# Patient Record
Sex: FEMALE | Race: WHITE | Employment: FULL TIME | URBAN - NONMETROPOLITAN AREA
[De-identification: names, ages, dates, MRNs, and addresses within clinical notes are randomized per-mention and may not be internally consistent; named-entity substitution may affect disease eponyms.]

---

## 2021-01-05 ENCOUNTER — TELEPHONE (OUTPATIENT)
Dept: OBGYN CLINIC | Age: 31
End: 2021-01-05

## 2021-01-05 NOTE — TELEPHONE ENCOUNTER
Patient left a message on the voicemail requesting a return call. I attempted to call her back and her voicemail was full.

## 2021-06-21 ENCOUNTER — TELEPHONE (OUTPATIENT)
Dept: OBGYN CLINIC | Age: 31
End: 2021-06-21

## 2021-06-21 RX ORDER — DROSPIRENONE AND ETHINYL ESTRADIOL TABLETS 0.02-3(28)
KIT ORAL
COMMUNITY
Start: 2021-05-28 | End: 2021-06-21 | Stop reason: SDUPTHER

## 2021-06-21 RX ORDER — DROSPIRENONE AND ETHINYL ESTRADIOL TABLETS 0.02-3(28)
1 KIT ORAL DAILY
Qty: 28 TABLET | Refills: 1 | Status: SHIPPED | OUTPATIENT
Start: 2021-06-21 | End: 2021-08-05

## 2021-08-05 RX ORDER — DROSPIRENONE AND ETHINYL ESTRADIOL 0.02-3(28)
1 KIT ORAL DAILY
Qty: 28 TABLET | Refills: 3 | Status: SHIPPED | OUTPATIENT
Start: 2021-08-05 | End: 2021-11-09 | Stop reason: SDUPTHER

## 2021-08-05 NOTE — TELEPHONE ENCOUNTER
Patient called. She had to reschedule her annual exam for November and requests refills of Kellen to last until then. I pended the medication for you to sign off on.

## 2021-11-09 ENCOUNTER — OFFICE VISIT (OUTPATIENT)
Dept: OBGYN CLINIC | Age: 31
End: 2021-11-09
Payer: MEDICARE

## 2021-11-09 ENCOUNTER — HOSPITAL ENCOUNTER (OUTPATIENT)
Age: 31
Setting detail: SPECIMEN
Discharge: HOME | End: 2021-11-09
Payer: MEDICARE

## 2021-11-09 VITALS
SYSTOLIC BLOOD PRESSURE: 130 MMHG | WEIGHT: 179.8 LBS | BODY MASS INDEX: 29.96 KG/M2 | DIASTOLIC BLOOD PRESSURE: 86 MMHG | HEIGHT: 65 IN

## 2021-11-09 DIAGNOSIS — N92.6 IRREGULAR MENSES: ICD-10-CM

## 2021-11-09 DIAGNOSIS — Z01.419 WOMEN'S ANNUAL ROUTINE GYNECOLOGICAL EXAMINATION: Primary | ICD-10-CM

## 2021-11-09 PROCEDURE — 99395 PREV VISIT EST AGE 18-39: CPT | Performed by: NURSE PRACTITIONER

## 2021-11-09 RX ORDER — NAPROXEN 500 MG/1
500 TABLET ORAL 2 TIMES DAILY WITH MEALS
COMMUNITY

## 2021-11-09 RX ORDER — DROSPIRENONE AND ETHINYL ESTRADIOL 0.02-3(28)
1 KIT ORAL DAILY
Qty: 28 TABLET | Refills: 12 | Status: SHIPPED | OUTPATIENT
Start: 2021-11-09

## 2021-11-09 NOTE — PROGRESS NOTES
YEARLY PHYSICAL    Date of service: 2021    Luis Lemon  Is a 28 y.o.  single female    PT's PCP is: No primary care provider on file. : 1990                                         Chaperone for Intimate Exam   Chaperone was offered as part of the rooming process. Patient declined and agrees to continue with exam without a chaperone. Subjective:       No LMP recorded. (Menstrual status: Other - See Notes).      Are your menses regular: not applicable    OB History    Para Term  AB Living   1 1 1     1   SAB IAB Ectopic Molar Multiple Live Births             1      # Outcome Date GA Lbr Pedro/2nd Weight Sex Delivery Anes PTL Lv   1 Term  40w0d   F Vag-Spont   SARMAD        Social History     Tobacco Use   Smoking Status Former Smoker   Smokeless Tobacco Never Used        Social History     Substance and Sexual Activity   Alcohol Use Yes       Family History   Problem Relation Age of Onset    Stroke Paternal Grandfather     Stroke Paternal Grandmother     Stroke Maternal Grandmother     Stroke Maternal Grandfather     Stroke Mother        Any family history of breast or ovarian cancer: No    Any family history of blood clots: No      Allergies: Pcn [penicillins]      Current Outpatient Medications:     naproxen (NAPROSYN) 500 MG tablet, Take 500 mg by mouth 2 times daily (with meals), Disp: , Rfl:     drospirenone-ethinyl estradiol (ELLEN) 3-0.02 MG per tablet, Take 1 tablet by mouth daily, Disp: 28 tablet, Rfl: 12    Social History     Substance and Sexual Activity   Sexual Activity Yes    Partners: Male    Birth control/protection: OCP       Any bleeding or pain with intercourse: Yes but not in awhile    Last Yearly:      Last pap: 2019, ASCUS    Last HPV: 2019, negative    Last Mammogram: never    Last Dexascan never    Do you do self breast exams: No    Past Medical History:   Diagnosis Date  Abnormal Pap smear of cervix     PCOS (polycystic ovarian syndrome)        History reviewed. No pertinent surgical history. Family History   Problem Relation Age of Onset    Stroke Paternal Grandfather     Stroke Paternal Grandmother     Stroke Maternal Grandmother     Stroke Maternal Grandfather     Stroke Mother        Chief Complaint   Patient presents with    Gynecologic Exam     Here for annual. Pap 5/2019 with ASCUS results and neg HPV. No concerns or issues today. Not having cycles d/t her OCP. She does need refills on OCP. PE:  Vital Signs  Blood pressure 130/86, height 5' 5\" (1.651 m), weight 179 lb 12.8 oz (81.6 kg). Estimated body mass index is 29.92 kg/m² as calculated from the following:    Height as of this encounter: 5' 5\" (1.651 m). Weight as of this encounter: 179 lb 12.8 oz (81.6 kg). NURSE: SAMIRA Fisher RN    HPI: Patient presents today for annual exam. Feeling well, voices no concerns. Denies breast/pelvic pain. ASCUS with negative HPV in 5/2019, will collect pap today. No menses with current OCP, desires refill. Review of Systems   All other systems reviewed and are negative. Physical Exam  Constitutional:       General: She is not in acute distress. Appearance: Normal appearance. She is not ill-appearing. Genitourinary:      Vulva normal.      No vaginal discharge. Right Adnexa: not tender. Left Adnexa: not tender. Uterus is not tender. Breasts:      Right: No mass, nipple discharge, skin change or tenderness. Left: No mass, nipple discharge, skin change or tenderness. HENT:      Head: Normocephalic. Cardiovascular:      Rate and Rhythm: Normal rate and regular rhythm. Pulmonary:      Effort: Pulmonary effort is normal.      Breath sounds: Normal breath sounds. Abdominal:      Palpations: Abdomen is soft. Tenderness: There is no abdominal tenderness. There is no guarding or rebound.    Musculoskeletal:         General: Normal range of motion. Neurological:      General: No focal deficit present. Mental Status: She is alert. Psychiatric:         Mood and Affect: Mood normal.         Behavior: Behavior normal.                         Assessment and Plan          Diagnosis Orders   1. Women's annual routine gynecological examination  PAP SMEAR   2. Irregular menses  drospirenone-ethinyl estradiol (ELLEN) 3-0.02 MG per tablet       Repeat Annual every 1 year  Cervical Cytology Evaluation begins at 24years old. If Negative Cytology, Follow-up screening per current guidelines. Mammograms every 1year. If 35 yo and last mammogram was negative. Routine healthmaintenance per patients PCP. happy with OCP, desires to continue. Reviewed risks/benefits, aches     I am having Fernando Lucas maintain her naproxen and drospirenone-ethinyl estradiol. Return in about 1 year (around 11/9/2022) for yearly. She was also counseled on her preventative health maintenance recommendations and follow-up. There are no Patient Instructions on file for this visit.     SHARMIN Valladares NP,11/9/2021 4:50 PM

## 2021-11-11 LAB
HPV SAMPLE: NORMAL
HPV, GENOTYPE 16: NOT DETECTED
HPV, GENOTYPE 18: NOT DETECTED
HPV, HIGH RISK OTHER: NOT DETECTED
HPV, INTERPRETATION: NORMAL
SPECIMEN DESCRIPTION: NORMAL

## 2021-11-17 LAB — CYTOLOGY REPORT: NORMAL

## 2021-11-23 NOTE — RESULT ENCOUNTER NOTE
Please notify patient of these results. LSIL with negative HPV. Current ASCCP guidelines recommend a 1 year follow up repeat pap. Looks like annual is scheduled just stress the importance of follow up thanks.

## 2022-04-25 ENCOUNTER — TELEPHONE (OUTPATIENT)
Dept: OBGYN CLINIC | Age: 32
End: 2022-04-25

## 2022-04-25 NOTE — TELEPHONE ENCOUNTER
Patient called and LM that she is having hot flashes at bedtime. States she cant sleep a lot of time, she will open the window, have the fan on but nothing helps. States that in the past she had some abnormal hormone labs including Testosterone and wasn't sure if there was something we could do or test for. States she is miserable, currently at work and its a chilly rainy day but she is terribly hot. Recommendations?

## 2022-05-02 ENCOUNTER — TELEPHONE (OUTPATIENT)
Dept: OBGYN CLINIC | Age: 32
End: 2022-05-02

## 2022-07-05 ENCOUNTER — HOSPITAL ENCOUNTER (OUTPATIENT)
Age: 32
Setting detail: SPECIMEN
Discharge: HOME | End: 2022-07-05

## 2022-07-05 ENCOUNTER — OFFICE VISIT (OUTPATIENT)
Dept: OBGYN CLINIC | Age: 32
End: 2022-07-05
Payer: MEDICARE

## 2022-07-05 VITALS — DIASTOLIC BLOOD PRESSURE: 83 MMHG | BODY MASS INDEX: 28.12 KG/M2 | SYSTOLIC BLOOD PRESSURE: 133 MMHG | WEIGHT: 169 LBS

## 2022-07-05 DIAGNOSIS — N94.10 DYSPAREUNIA IN FEMALE: ICD-10-CM

## 2022-07-05 DIAGNOSIS — R53.83 FATIGUE, UNSPECIFIED TYPE: ICD-10-CM

## 2022-07-05 DIAGNOSIS — R23.2 HOT FLASHES: ICD-10-CM

## 2022-07-05 DIAGNOSIS — N93.0 POSTCOITAL AND CONTACT BLEEDING: ICD-10-CM

## 2022-07-05 DIAGNOSIS — N93.0 POSTCOITAL AND CONTACT BLEEDING: Primary | ICD-10-CM

## 2022-07-05 PROCEDURE — 99213 OFFICE O/P EST LOW 20 MIN: CPT | Performed by: NURSE PRACTITIONER

## 2022-07-06 ENCOUNTER — HOSPITAL ENCOUNTER (OUTPATIENT)
Age: 32
Setting detail: SPECIMEN
Discharge: HOME | End: 2022-07-06

## 2022-07-06 DIAGNOSIS — R23.2 HOT FLASHES: ICD-10-CM

## 2022-07-06 LAB
ABSOLUTE EOS #: 0.17 K/UL (ref 0–0.44)
ABSOLUTE IMMATURE GRANULOCYTE: 0.04 K/UL (ref 0–0.3)
ABSOLUTE LYMPH #: 2.85 K/UL (ref 1.1–3.7)
ABSOLUTE MONO #: 0.94 K/UL (ref 0.1–1.2)
BASOPHILS # BLD: 1 % (ref 0–2)
BASOPHILS ABSOLUTE: 0.08 K/UL (ref 0–0.2)
CANDIDA SPECIES, DNA PROBE: NEGATIVE
EOSINOPHILS RELATIVE PERCENT: 2 % (ref 1–4)
GARDNERELLA VAGINALIS, DNA PROBE: NEGATIVE
HCT VFR BLD CALC: 41.1 % (ref 36.3–47.1)
HEMOGLOBIN: 13.3 G/DL (ref 11.9–15.1)
IMMATURE GRANULOCYTES: 0 %
LYMPHOCYTES # BLD: 27 % (ref 24–43)
MCH RBC QN AUTO: 30.8 PG (ref 25.2–33.5)
MCHC RBC AUTO-ENTMCNC: 32.4 G/DL (ref 28.4–34.8)
MCV RBC AUTO: 95.1 FL (ref 82.6–102.9)
MONOCYTES # BLD: 9 % (ref 3–12)
NRBC AUTOMATED: 0 PER 100 WBC
PDW BLD-RTO: 12.4 % (ref 11.8–14.4)
PLATELET # BLD: 325 K/UL (ref 138–453)
PMV BLD AUTO: 10.9 FL (ref 8.1–13.5)
RBC # BLD: 4.32 M/UL (ref 3.95–5.11)
SEG NEUTROPHILS: 61 % (ref 36–65)
SEGMENTED NEUTROPHILS ABSOLUTE COUNT: 6.48 K/UL (ref 1.5–8.1)
SOURCE: NORMAL
TRICHOMONAS VAGINALIS DNA: NEGATIVE
WBC # BLD: 10.6 K/UL (ref 3.5–11.3)

## 2022-07-07 ENCOUNTER — TELEPHONE (OUTPATIENT)
Dept: OBGYN CLINIC | Age: 32
End: 2022-07-07

## 2022-07-07 LAB
C TRACH DNA GENITAL QL NAA+PROBE: NEGATIVE
FERRITIN: 91 NG/ML (ref 13–150)
IRON: 76 UG/DL (ref 37–145)
N. GONORRHOEAE DNA: NEGATIVE
SPECIMEN DESCRIPTION: NORMAL
TSH SERPL DL<=0.05 MIU/L-ACNC: 1.72 UIU/ML (ref 0.3–5)
VITAMIN B-12: 310 PG/ML (ref 232–1245)

## 2022-07-07 NOTE — TELEPHONE ENCOUNTER
I attempted to call patient and had to leave a message. Reviewed normal labs and cultures. Patient to call back with any further questions/concerns.

## 2022-07-07 NOTE — TELEPHONE ENCOUNTER
----- Message from SHARMIN Griggs NP sent at 7/7/2022  1:31 PM EDT -----  Please let patient know all recent labs and cultures were normal and negative

## 2022-07-10 ASSESSMENT — ENCOUNTER SYMPTOMS
CHEST TIGHTNESS: 0
GASTROINTESTINAL NEGATIVE: 1
SHORTNESS OF BREATH: 0

## 2022-07-10 NOTE — PROGRESS NOTES
PROBLEM VISIT     Date of service: 2022    Amber Gaffney  Is a 28 y.o. female    PT's PCP is: No primary care provider on file. : 1990                                             Subjective:       No LMP recorded. (Menstrual status: Other - See Notes). OB History    Para Term  AB Living   1 1 1     1   SAB IAB Ectopic Molar Multiple Live Births             1      # Outcome Date GA Lbr Pedro/2nd Weight Sex Delivery Anes PTL Lv   1 Term  40w0d   F Vag-Spont   SARMAD        Social History     Tobacco Use   Smoking Status Former Smoker   Smokeless Tobacco Never Used        Social History     Substance and Sexual Activity   Alcohol Use Yes       Allergies: Pcn [penicillins]      Current Outpatient Medications:     naproxen (NAPROSYN) 500 MG tablet, Take 500 mg by mouth 2 times daily (with meals), Disp: , Rfl:     drospirenone-ethinyl estradiol (ELLEN) 3-0.02 MG per tablet, Take 1 tablet by mouth daily, Disp: 28 tablet, Rfl: 12    Social History     Substance and Sexual Activity   Sexual Activity Yes    Partners: Male    Birth control/protection: OCP     Chief Complaint   Patient presents with    Other     C/O feeling hot all the time, trouble sleeping d/t being hot. Reports hair thinning and loss of energy. C/O post coital bleeding sometimes with pain- reports s.o. feels like he's hitting something. PE:  Vital Signs  Blood pressure 133/83, weight 169 lb (76.7 kg). HPI: Patient presents today with with complaints of feeling hot at all times, thinning of hair and fatigue. Hx of PCOS. States symptoms onset within the 6 months. She was sick with Covid shortly prior to symptom onset. Denies any additional s/s of endocrine dysfunction. No menses with Ellen. PT denies fever, chills, nausea and vomiting       Review of Systems   Constitutional: Positive for fatigue. Respiratory: Negative for chest tightness and shortness of breath.     Cardiovascular: Negative for chest pain and palpitations. Gastrointestinal: Negative. Endocrine: Positive for heat intolerance. Negative for polydipsia, polyphagia and polyuria. Hair thinning   Genitourinary: Positive for dyspareunia (intermittent, PCB). Negative for dysuria, frequency, menstrual problem, pelvic pain, vaginal bleeding and vaginal discharge. Neurological: Negative for dizziness, light-headedness and headaches. Physical Exam  Constitutional:       Appearance: Normal appearance. HENT:      Head: Normocephalic. Pulmonary:      Effort: Pulmonary effort is normal.   Genitourinary:     General: Normal vulva. Vagina: No vaginal discharge, erythema or bleeding. Cervix: Normal.      Uterus: Not tender. Adnexa:         Right: No tenderness. Left: No tenderness. Musculoskeletal:         General: Normal range of motion. Neurological:      General: No focal deficit present. Mental Status: She is alert. Psychiatric:         Mood and Affect: Mood normal.         Behavior: Behavior normal.       Chaperone: not present    Assessment and Plan          Diagnosis Orders   1. Postcoital and contact bleeding  Vaginitis DNA Probe    C.trachomatis N.gonorrhoeae DNA   2. Hot flashes  TSH With Reflex Ft4    Vitamin B12    CBC with Auto Differential    Ferritin    Iron   3. Dyspareunia in female  Vaginitis DNA Probe    C.trachomatis N.gonorrhoeae DNA   4. Fatigue, unspecified type       Discussed possible differentials. Encouraged vitamin supplementation, appropriate 8 hrs of sleep nightly and exercise/diet- as all factors contribute to overall wellbeing. Will await vaginal culture results if negative can proceed with usn. I am having Kyara Reid maintain her naproxen and drospirenone-ethinyl estradiol. Return if symptoms worsen or fail to improve. There are no Patient Instructions on file for this visit.     Time spent 20 minutes      SHARMIN Casey NP,7/10/2022 1:16 PM

## 2022-10-27 ENCOUNTER — TELEPHONE (OUTPATIENT)
Dept: OBGYN CLINIC | Age: 32
End: 2022-10-27

## 2022-10-27 NOTE — TELEPHONE ENCOUNTER
Pt called stating she has no insurance until January. She did push her annual out until MT returns as well as ins goes in to [facility]. She is scheduled 1-24 for that. She also stated she can not afford the [facility] she is currently taking and is wondering if there is something she can take that is much cheaper and close to what she is taking now.

## 2022-10-27 NOTE — TELEPHONE ENCOUNTER
LM on vm advising pt with using GoodRx coupon, she could get Kellen between $16-$20 at [facility].  Advised to call back if this is still too expensive

## 2022-12-05 DIAGNOSIS — N92.6 IRREGULAR MENSES: ICD-10-CM

## 2022-12-05 RX ORDER — DROSPIRENONE AND ETHINYL ESTRADIOL 0.02-3(28)
1 KIT ORAL DAILY
Qty: 28 TABLET | Refills: 1 | Status: SHIPPED | OUTPATIENT
Start: 2022-12-05

## 2023-02-01 ENCOUNTER — OFFICE VISIT (OUTPATIENT)
Dept: OBGYN CLINIC | Age: 33
End: 2023-02-01
Payer: MEDICARE

## 2023-02-01 VITALS
SYSTOLIC BLOOD PRESSURE: 118 MMHG | WEIGHT: 172.6 LBS | DIASTOLIC BLOOD PRESSURE: 77 MMHG | BODY MASS INDEX: 28.76 KG/M2 | HEIGHT: 65 IN

## 2023-02-01 DIAGNOSIS — N93.0 POSTCOITAL AND CONTACT BLEEDING: ICD-10-CM

## 2023-02-01 DIAGNOSIS — N92.6 IRREGULAR MENSES: ICD-10-CM

## 2023-02-01 DIAGNOSIS — N94.10 DYSPAREUNIA IN FEMALE: ICD-10-CM

## 2023-02-01 DIAGNOSIS — Z01.419 WOMEN'S ANNUAL ROUTINE GYNECOLOGICAL EXAMINATION: Primary | ICD-10-CM

## 2023-02-01 PROCEDURE — 99395 PREV VISIT EST AGE 18-39: CPT | Performed by: NURSE PRACTITIONER

## 2023-02-01 RX ORDER — DROSPIRENONE AND ETHINYL ESTRADIOL 0.02-3(28)
1 KIT ORAL DAILY
Qty: 28 TABLET | Refills: 12 | Status: SHIPPED | OUTPATIENT
Start: 2023-02-01

## 2023-02-01 ASSESSMENT — ENCOUNTER SYMPTOMS
ABDOMINAL PAIN: 0
CONSTIPATION: 0
DIARRHEA: 0
SHORTNESS OF BREATH: 0

## 2023-02-01 NOTE — PROGRESS NOTES
YEARLY PHYSICAL    Date of service: 2023    Angel Marroquin  Is a 33 y.o.  single female    PT's PCP is: No primary care provider on file. : 1990                                         Chaperone for Intimate Exam  Chaperone was offered as part of the rooming process. Patient declined and agrees to continue with exam without a chaperone. Chaperone: n/a      Subjective:       No LMP recorded (lmp unknown). (Menstrual status: Other - See Notes).      Are your menses regular: no    OB History    Para Term  AB Living   1 1 1     1   SAB IAB Ectopic Molar Multiple Live Births             1      # Outcome Date GA Lbr Pedro/2nd Weight Sex Delivery Anes PTL Lv   1 Term  40w0d   F Vag-Spont   SARMAD        Social History     Tobacco Use   Smoking Status Former   Smokeless Tobacco Never        Social History     Substance and Sexual Activity   Alcohol Use Yes       Family History   Problem Relation Age of Onset    Stroke Paternal Grandfather     Stroke Paternal Grandmother     Stroke Maternal Grandmother     Stroke Maternal Grandfather     Stroke Mother        Any family history of breast or ovarian cancer: No    Any family history of blood clots: No      Allergies: Pcn [penicillins]      Current Outpatient Medications:     drospirenone-ethinyl estradiol (ELLEN) 3-0.02 MG per tablet, Take 1 tablet by mouth daily, Disp: 28 tablet, Rfl: 12    naproxen (NAPROSYN) 500 MG tablet, Take 500 mg by mouth 2 times daily (with meals) (Patient not taking: Reported on 2023), Disp: , Rfl:     Social History     Substance and Sexual Activity   Sexual Activity Yes    Partners: Male    Birth control/protection: OCP       Any bleeding or pain with intercourse: Yes    Last Yearly:  21    Last pap: 21-neg    Last HPV: 21-neg    Last Mammogram: n/a    Do you do self breast exams: Yes    Past Medical History:   Diagnosis Date Abnormal Pap smear of cervix     PCOS (polycystic ovarian syndrome)        History reviewed. No pertinent surgical history. Family History   Problem Relation Age of Onset    Stroke Paternal Grandfather     Stroke Paternal Grandmother     Stroke Maternal Grandmother     Stroke Maternal Grandfather     Stroke Mother        Chief Complaint   Patient presents with    Annual Exam     Last pap 11/9/21. No menses with OCP. Intermittent dyspareunia and post coital bleeding. PE:  Vital Signs  Blood pressure 118/77, height 5' 5\" (1.651 m), weight 172 lb 9.6 oz (78.3 kg). Estimated body mass index is 28.72 kg/m² as calculated from the following:    Height as of this encounter: 5' 5\" (1.651 m). Weight as of this encounter: 172 lb 9.6 oz (78.3 kg). NURSE: michael    HPI: Patient presents today for annual exam. Feeling well. Denies breast/pelvic pain. Pap due, LSIL negative HPV 11/2021. Wellness reviewed. Reports cycles well controlled with OCP. Desires refill. Complains of intermittent pain with intercourse; positional. Spotting after intercourse. Denies discharge, itching or irritation. Monogamous relationship. Review of Systems   Constitutional:  Negative for chills, fatigue and fever. Respiratory:  Negative for shortness of breath. Cardiovascular:  Negative for chest pain. Gastrointestinal:  Negative for abdominal pain, constipation and diarrhea. Genitourinary:  Positive for dyspareunia (intermittent). Negative for dysuria, enuresis, frequency, menstrual problem, pelvic pain, urgency and vaginal bleeding. PCB     Neurological:  Negative for dizziness, light-headedness and headaches. Physical Exam  Constitutional:       General: She is not in acute distress. Appearance: Normal appearance. She is not ill-appearing. Genitourinary:      Vulva, bladder and urethral meatus normal.      No lesions in the vagina. Right Labia: No rash or lesions.      Left Labia: No lesions or rash.     No vaginal discharge. No vaginal prolapse present. No vaginal atrophy present. Right Adnexa: not tender and no mass present. Left Adnexa: not tender and no mass present. Cervical friability present. No cervical motion tenderness or discharge. No parametrium nodularity present. Uterus is not enlarged or tender. No uterine mass detected. No urethral prolapse, tenderness or mass present. Breasts:     Right: No mass, nipple discharge, skin change or tenderness. Left: No mass, nipple discharge, skin change or tenderness. HENT:      Head: Normocephalic and atraumatic. Eyes:      Extraocular Movements: Extraocular movements intact. Pupils: Pupils are equal, round, and reactive to light. Cardiovascular:      Rate and Rhythm: Normal rate. Pulmonary:      Effort: Pulmonary effort is normal.   Abdominal:      Palpations: Abdomen is soft. Tenderness: There is no abdominal tenderness. There is no guarding or rebound. Musculoskeletal:         General: Normal range of motion. Neurological:      General: No focal deficit present. Mental Status: She is alert and oriented to person, place, and time. Skin:     General: Skin is warm and dry. Psychiatric:         Mood and Affect: Mood normal.         Behavior: Behavior normal.                           Assessment and Plan          Diagnosis Orders   1. Women's annual routine gynecological examination  PAP SMEAR      2. Postcoital and contact bleeding        3. Dyspareunia in female        4. Irregular menses  drospirenone-ethinyl estradiol (ELLEN) 3-0.02 MG per tablet          Repeat Annual every 1 year  Cervical Cytology Evaluation begins at 24years old. If Negative Cytology, Follow-up screening per current guidelines. Mammograms every 1year. If 37 yo and last mammogram was negative. Routine healthmaintenance per patients PCP.     discussed trial of positions in which patient can control depth of penetration. Desires refill of of OCP. Reviewed risks/benefits, aches      I am having Michael Fried maintain her naproxen and drospirenone-ethinyl estradiol. Return in about 1 year (around 2/1/2024). She was also counseled on her preventative health maintenance recommendations and follow-up. There are no Patient Instructions on file for this visit.     Rinku Carrillo, SHARMIN - NP,2/1/2023 3:32 PM

## 2023-02-02 ENCOUNTER — HOSPITAL ENCOUNTER (OUTPATIENT)
Age: 33
Setting detail: SPECIMEN
Discharge: HOME | End: 2023-02-02

## 2023-05-24 ENCOUNTER — TELEPHONE (OUTPATIENT)
Dept: OBGYN CLINIC | Age: 33
End: 2023-05-24

## 2023-06-01 RX ORDER — SPIRONOLACTONE 100 MG/1
100 TABLET, FILM COATED ORAL DAILY
Qty: 90 TABLET | Refills: 1 | Status: SHIPPED | OUTPATIENT
Start: 2023-06-01

## 2023-06-05 NOTE — TELEPHONE ENCOUNTER
I left a message on patient's voicemail confirming that refills were sent to the pharmacy. She is to call with any further questions/concerns.

## 2024-02-05 ENCOUNTER — HOSPITAL ENCOUNTER (OUTPATIENT)
Age: 34
Setting detail: SPECIMEN
Discharge: HOME | End: 2024-02-05

## 2024-02-05 ENCOUNTER — OFFICE VISIT (OUTPATIENT)
Dept: OBGYN CLINIC | Age: 34
End: 2024-02-05
Payer: COMMERCIAL

## 2024-02-05 VITALS
DIASTOLIC BLOOD PRESSURE: 84 MMHG | HEIGHT: 65 IN | SYSTOLIC BLOOD PRESSURE: 124 MMHG | BODY MASS INDEX: 29.82 KG/M2 | WEIGHT: 179 LBS

## 2024-02-05 DIAGNOSIS — R68.82 LOW LIBIDO: ICD-10-CM

## 2024-02-05 DIAGNOSIS — Z01.419 WOMEN'S ANNUAL ROUTINE GYNECOLOGICAL EXAMINATION: Primary | ICD-10-CM

## 2024-02-05 PROCEDURE — G8484 FLU IMMUNIZE NO ADMIN: HCPCS | Performed by: NURSE PRACTITIONER

## 2024-02-05 PROCEDURE — 99395 PREV VISIT EST AGE 18-39: CPT | Performed by: NURSE PRACTITIONER

## 2024-02-05 RX ORDER — DROSPIRENONE AND ETHINYL ESTRADIOL 0.03MG-3MG
KIT ORAL
COMMUNITY
Start: 2024-01-20 | End: 2024-02-09 | Stop reason: ALTCHOICE

## 2024-02-05 NOTE — PROGRESS NOTES
YEARLY PHYSICAL    Date of service: 2024    Terra Rodarte  Is a 33 y.o. female    PT's PCP is: No primary care provider on file.     : 1990                                         Chaperone for Intimate Exam  Chaperone was offered as part of the rooming process. Patient declined and agrees to continue with exam without a chaperone.  Chaperone: n/a      Subjective:       No LMP recorded. (Menstrual status: Other - See Notes).     Are your menses regular: no menses with OCP.     OB History    Para Term  AB Living   1 1 1     1   SAB IAB Ectopic Molar Multiple Live Births             1      # Outcome Date GA Lbr Pedro/2nd Weight Sex Delivery Anes PTL Lv   1 Term  40w0d   F Vag-Spont   SARMAD        Social History     Tobacco Use   Smoking Status Former   Smokeless Tobacco Never        Social History     Substance and Sexual Activity   Alcohol Use Yes       Family History   Problem Relation Age of Onset    Stroke Paternal Grandfather     Stroke Paternal Grandmother     Stroke Maternal Grandmother     Stroke Maternal Grandfather     Stroke Mother        Any family history of breast or ovarian cancer: No    Any family history of blood clots: No      Allergies: Pcn [penicillins]      Current Outpatient Medications:     drospirenone-ethinyl estradiol 3-0.03 MG TABS, , Disp: , Rfl:     spironolactone (ALDACTONE) 100 MG tablet, Take 1 tablet by mouth daily, Disp: 90 tablet, Rfl: 1    naproxen (NAPROSYN) 500 MG tablet, Take 500 mg by mouth 2 times daily (with meals) (Patient not taking: Reported on 2023), Disp: , Rfl:     Social History     Substance and Sexual Activity   Sexual Activity Yes    Partners: Male    Birth control/protection: OCP       Any bleeding or pain with intercourse: No    Last Yearly:  23    Last pap: 23-LSIL    Last HPV: 23-neg    Last Mammogram: n/a    Last Dexascan n/a    Last colorectal

## 2024-02-06 LAB
HPV I/H RISK 4 DNA CVX QL NAA+PROBE: NOT DETECTED
HPV SAMPLE: NORMAL
HPV, INTERPRETATION: NORMAL
HPV16 DNA CVX QL NAA+PROBE: NOT DETECTED
HPV18 DNA CVX QL NAA+PROBE: NOT DETECTED
SPECIMEN DESCRIPTION: NORMAL

## 2024-02-09 ASSESSMENT — ENCOUNTER SYMPTOMS
SHORTNESS OF BREATH: 0
DIARRHEA: 0
CONSTIPATION: 0
ABDOMINAL PAIN: 0

## 2024-02-13 ENCOUNTER — TELEPHONE (OUTPATIENT)
Dept: OBGYN CLINIC | Age: 34
End: 2024-02-13

## 2024-02-13 RX ORDER — DROSPIRENONE AND ETHINYL ESTRADIOL 0.02-3(28)
1 KIT ORAL DAILY
Qty: 1 PACKET | Refills: 11 | Status: SHIPPED | OUTPATIENT
Start: 2024-02-13

## 2024-02-13 NOTE — TELEPHONE ENCOUNTER
Spoke to patient and reviewed Blu's recommendations.  Patient verbalized understanding, no further questions/concerns voiced.

## 2024-02-13 NOTE — TELEPHONE ENCOUNTER
Patient left a message on the voicemail that she would like to go back on her OCPs.  She would like the same medication as previously.  She was just in earlier this month for an annual.  Ok to send in a year of refills?

## 2024-02-27 LAB — CYTOLOGY REPORT: NORMAL

## 2024-03-14 ENCOUNTER — HOSPITAL ENCOUNTER (OUTPATIENT)
Age: 34
Setting detail: SPECIMEN
Discharge: HOME | End: 2024-03-14

## 2024-03-14 ENCOUNTER — OFFICE VISIT (OUTPATIENT)
Dept: OBGYN CLINIC | Age: 34
End: 2024-03-14
Payer: COMMERCIAL

## 2024-03-14 VITALS — SYSTOLIC BLOOD PRESSURE: 112 MMHG | BODY MASS INDEX: 29.29 KG/M2 | DIASTOLIC BLOOD PRESSURE: 80 MMHG | WEIGHT: 176 LBS

## 2024-03-14 DIAGNOSIS — N76.0 ACUTE VAGINITIS: Primary | ICD-10-CM

## 2024-03-14 DIAGNOSIS — N89.8 VAGINAL LESION: ICD-10-CM

## 2024-03-14 DIAGNOSIS — N76.0 ACUTE VAGINITIS: ICD-10-CM

## 2024-03-14 LAB
CANDIDA SPECIES: NEGATIVE
GARDNERELLA VAGINALIS: NEGATIVE
SOURCE: NORMAL
TRICHOMONAS: NEGATIVE

## 2024-03-14 PROCEDURE — G8427 DOCREV CUR MEDS BY ELIG CLIN: HCPCS | Performed by: NURSE PRACTITIONER

## 2024-03-14 PROCEDURE — 1036F TOBACCO NON-USER: CPT | Performed by: NURSE PRACTITIONER

## 2024-03-14 PROCEDURE — G8484 FLU IMMUNIZE NO ADMIN: HCPCS | Performed by: NURSE PRACTITIONER

## 2024-03-14 PROCEDURE — 99214 OFFICE O/P EST MOD 30 MIN: CPT | Performed by: NURSE PRACTITIONER

## 2024-03-14 PROCEDURE — G8419 CALC BMI OUT NRM PARAM NOF/U: HCPCS | Performed by: NURSE PRACTITIONER

## 2024-03-14 RX ORDER — FLUCONAZOLE 150 MG/1
150 TABLET ORAL ONCE
Qty: 1 TABLET | Refills: 0 | Status: SHIPPED | OUTPATIENT
Start: 2024-03-14 | End: 2024-03-14

## 2024-03-14 RX ORDER — METRONIDAZOLE 7.5 MG/G
1 GEL VAGINAL DAILY
Qty: 1 EACH | Refills: 0 | Status: SHIPPED | OUTPATIENT
Start: 2024-03-14 | End: 2024-03-19

## 2024-03-14 ASSESSMENT — ENCOUNTER SYMPTOMS: RESPIRATORY NEGATIVE: 1

## 2024-03-14 NOTE — PROGRESS NOTES
PROBLEM VISIT     Date of service: 3/14/2024    Terra Rodarte  Is a 33 y.o. female    PT's PCP is: No primary care provider on file.     : 1990                                             Subjective:       No LMP recorded. (Menstrual status: Other - See Notes).   OB History    Para Term  AB Living   1 1 1     1   SAB IAB Ectopic Molar Multiple Live Births             1      # Outcome Date GA Lbr Pedro/2nd Weight Sex Delivery Anes PTL Lv   1 Term  40w0d   F Vag-Spont   SARMAD        Social History     Tobacco Use   Smoking Status Former   Smokeless Tobacco Never        Social History     Substance and Sexual Activity   Alcohol Use Yes       Allergies: Pcn [penicillins]      Current Outpatient Medications:     metroNIDAZOLE (METROGEL) 0.75 % vaginal gel, Place 1 Applicatorful vaginally daily for 5 days, Disp: 1 each, Rfl: 0    fluconazole (DIFLUCAN) 150 MG tablet, Take 1 tablet by mouth once for 1 dose, Disp: 1 tablet, Rfl: 0    drospirenone-ethinyl estradiol (ELLEN) 3-0.02 MG per tablet, Take 1 tablet by mouth daily, Disp: 1 packet, Rfl: 11    spironolactone (ALDACTONE) 100 MG tablet, Take 1 tablet by mouth daily (Patient not taking: Reported on 3/14/2024), Disp: 90 tablet, Rfl: 1    naproxen (NAPROSYN) 500 MG tablet, Take 500 mg by mouth 2 times daily (with meals) (Patient not taking: Reported on 2023), Disp: , Rfl:     Social History     Substance and Sexual Activity   Sexual Activity Yes    Partners: Male    Birth control/protection: OCP         Chief Complaint   Patient presents with    Vaginal Itching     Vaginal itching and discharge for the past week. Has used OTC treatment for yeast and states it has improved.        PE:  Vital Signs  Blood pressure 112/80, weight 79.8 kg (176 lb).     HPI: Patient presents today with complaints of vaginal itching and discharge for the past 1.5-2 weeks. Used OTC Monistat twice with some improvement. Reports burning when urine hits the skin.

## 2024-03-20 ENCOUNTER — HOSPITAL ENCOUNTER (OUTPATIENT)
Age: 34
Setting detail: SPECIMEN
Discharge: HOME | End: 2024-03-20

## 2024-03-20 ENCOUNTER — PROCEDURE VISIT (OUTPATIENT)
Dept: OBGYN CLINIC | Age: 34
End: 2024-03-20

## 2024-03-20 VITALS — WEIGHT: 176 LBS | DIASTOLIC BLOOD PRESSURE: 102 MMHG | BODY MASS INDEX: 29.29 KG/M2 | SYSTOLIC BLOOD PRESSURE: 156 MMHG

## 2024-03-20 DIAGNOSIS — R87.612 LGSIL ON PAP SMEAR OF CERVIX: Primary | ICD-10-CM

## 2024-03-20 DIAGNOSIS — N89.8 VAGINAL LESION: ICD-10-CM

## 2024-03-20 NOTE — PROGRESS NOTES
Colposcopy Procedure Note    Indications: Pap smear 1 months ago showed: low-grade squamous intraepithelial neoplasia (LGSIL - encompassing HPV,mild dysplasia,ILYA I). The prior pap showed LGSIL.  Prior cervical/vaginal disease: normal exam without visible pathology. Prior cervical treatment: no treatment.    Procedure Details   The risks and benefits of the procedure and Verbal informed consent obtained.    Speculum placed in vagina and excellent visualization of cervix achieved, cervix swabbed x 3 with acetic acid solution.    Findings:  Cervix: acetowhite lesion(s) noted at 11, 1, 5 o'clock; SCJ visualized - lesion at 1, 11, 5 o'clock, endocervical curettage performed, cervical biopsies taken at 1, 11, 5 o'clock, specimen labelled and sent to pathology, and hemostasis achieved with Monsel's solution.  Vaginal inspection: normal without visible lesions.  Vulvar colposcopy: vulvar colposcopy not performed.    Specimens: roma, 1, 11, 5    Complications: none.    Plan:  Specimens labelled and sent to Pathology.  Will base further treatment on Pathology findings.  Treatment options discussed with patient.

## 2024-03-21 LAB
HSV1 IGG SERPL QL IA: 0.15
HSV1+2 IGM SER QL IA: 0.91
HSV2 AB SER QL IA: 0.03

## 2024-03-22 LAB — SURGICAL PATHOLOGY REPORT: NORMAL

## 2024-03-30 RX ORDER — VALACYCLOVIR HYDROCHLORIDE 500 MG/1
500 TABLET, FILM COATED ORAL 2 TIMES DAILY
Qty: 6 TABLET | Refills: 3 | Status: SHIPPED | OUTPATIENT
Start: 2024-03-30

## 2025-02-06 ENCOUNTER — HOSPITAL ENCOUNTER (OUTPATIENT)
Age: 35
Setting detail: SPECIMEN
Discharge: HOME | End: 2025-02-06

## 2025-02-06 ENCOUNTER — OFFICE VISIT (OUTPATIENT)
Dept: OBGYN CLINIC | Age: 35
End: 2025-02-06
Payer: COMMERCIAL

## 2025-02-06 VITALS
BODY MASS INDEX: 27.66 KG/M2 | WEIGHT: 166 LBS | SYSTOLIC BLOOD PRESSURE: 132 MMHG | DIASTOLIC BLOOD PRESSURE: 86 MMHG | HEIGHT: 65 IN

## 2025-02-06 DIAGNOSIS — L68.9 EXCESSIVE HAIR GROWTH: ICD-10-CM

## 2025-02-06 DIAGNOSIS — N92.6 IRREGULAR MENSES: ICD-10-CM

## 2025-02-06 DIAGNOSIS — N92.6 MENSTRUAL CHANGES: ICD-10-CM

## 2025-02-06 DIAGNOSIS — Z01.419 WOMEN'S ANNUAL ROUTINE GYNECOLOGICAL EXAMINATION: Primary | ICD-10-CM

## 2025-02-06 DIAGNOSIS — R68.82 LOW LIBIDO: ICD-10-CM

## 2025-02-06 LAB
B-HCG SERPL EIA 3RD IS-ACNC: <0.2 MIU/ML
FSH SERPL-ACNC: 8.5 MIU/ML
LH SERPL-ACNC: 7.5 MIU/ML (ref 1.7–8.6)
SHBG SERPL-SCNC: 50 NMOL/L (ref 25–122)
TESTOST FREE MFR SERPL: 6.4 PG/ML (ref 1.3–9.2)
TESTOST SERPL-MCNC: 46 NG/DL (ref 8–48)
TESTOSTERONE, BIOAVAILABLE: 14.9 NG/DL (ref 4.1–25.5)
TSH SERPL DL<=0.05 MIU/L-ACNC: 1.03 UIU/ML (ref 0.27–4.2)

## 2025-02-06 PROCEDURE — 99395 PREV VISIT EST AGE 18-39: CPT | Performed by: NURSE PRACTITIONER

## 2025-02-06 NOTE — PROGRESS NOTES
YEARLY PHYSICAL    Date of service: 2025    Terra Rodarte  Is a 34 y.o. female    PT's PCP is: No primary care provider on file.     : 1990                                         Chaperone for Intimate Exam  Chaperone was offered as part of the rooming process. Patient declined and agrees to continue with exam without a chaperone.  Chaperone: n/a      Subjective:       Patient's last menstrual period was 2025 (approximate).     Are your menses regular: yes    OB History    Para Term  AB Living   1 1 1     1   SAB IAB Ectopic Molar Multiple Live Births             1      # Outcome Date GA Lbr Pedro/2nd Weight Sex Type Anes PTL Lv   1 Term  40w0d   F Vag-Spont   SARMAD        Social History     Tobacco Use   Smoking Status Former   Smokeless Tobacco Never        Social History     Substance and Sexual Activity   Alcohol Use Yes       Family History   Problem Relation Age of Onset    Stroke Paternal Grandfather     Stroke Paternal Grandmother     Stroke Maternal Grandmother     Stroke Maternal Grandfather     Stroke Mother        Any family history of breast or ovarian cancer: No    Any family history of blood clots: No      Allergies: Pcn [penicillins]      Current Outpatient Medications:     valACYclovir (VALTREX) 500 MG tablet, Take 1 tablet by mouth 2 times daily (Patient not taking: Reported on 2025), Disp: 6 tablet, Rfl: 3    drospirenone-ethinyl estradiol (ELLEN) 3-0.02 MG per tablet, Take 1 tablet by mouth daily (Patient not taking: Reported on 2025), Disp: 1 packet, Rfl: 11    Social History     Substance and Sexual Activity   Sexual Activity Yes    Partners: Male       Any bleeding or pain with intercourse: No    Last Yearly:  24    Last pap: 24- LSIL  2023- LSIL  2021- LSIL    Last HPV: 25-neg  2023- negative  2021- negative     Colpo 3/2024- NO ILYA    Last Mammogram: n/a    Last

## 2025-02-14 LAB — CYTOLOGY REPORT: NORMAL

## 2025-02-17 RX ORDER — DROSPIRENONE AND ETHINYL ESTRADIOL 0.02-3(28)
1 KIT ORAL DAILY
Qty: 1 PACKET | Refills: 11 | Status: SHIPPED | OUTPATIENT
Start: 2025-02-17

## 2025-02-17 ASSESSMENT — ENCOUNTER SYMPTOMS
DIARRHEA: 0
CONSTIPATION: 0
SHORTNESS OF BREATH: 0
ABDOMINAL PAIN: 0